# Patient Record
(demographics unavailable — no encounter records)

---

## 2024-11-11 NOTE — HISTORY OF PRESENT ILLNESS
[FreeTextEntry1] : f/up for Type 2 diabetes mellitus, obesity  Patient with past medical hx as below, remarkable for cirrhosis with ascites, fatty liver disease    Screening  Ophthalmology: follows LDL: 27 EGFR: 75   Current diabetic medication Ozempic 2mg Q weekly   no vomiting, no abd pain

## 2024-11-12 NOTE — ASSESSMENT
[FreeTextEntry1] : Ms. DESIREE DRISCOLL is 50-year-old female who is being seen for follow up visit with cirrhosis secondary to MASLD and MetALD.  BW on 11/11/2024: Normal PLT morphology, , INR 1.22, WDL-CMP, AFP, A1c% US ab on 05/28/2024: Coarsened hepatic parenchyma compatible with sequela of hepatocellular disease. Nonspecific gallbladder wall thickening to 0.34 cm. Gallbladder sludge and stones are seen. Splenomegaly. (15.4 cm)  # Cirrhosis on the Imaging + Ascites - Advised to hold off on Diuretic dose to Furosemide 20 mg and Spironolactone 25 mg/day as Ascites seems well controlled, denies any s/s dehydration or hypotension. Stable renal function and No s/s of fluid overload and Edema on PE today.  + PTHN - c/w Carvedilol 6.25 mg BD as tolerated, was on 3.25 mg. + Thrombocytopenia with Splenomegaly reviewed. with normal morphology of PLT, could hold off on EGD for now. > HCC screening- None in reviewed screening with WDL-AFP and US ab with no lesions. Due to repeating the screen with US and AFP as ordered. I have explained the need for imaging every 6 months to screen for liver cancer. + EV on EGD on 02/16/2021: Grade 1 varices in the lower third of the esophagus. I explained the risks for the development of esophageal varices with and without bleeding, especially while planning for Surgery. > No decompensation - No ascites, hepatocellular carcinoma, and liver failure. She has no signs of encephalopathy on the physical exam. She has no asterixis. > Transplant candidacy - Patient appears to be an excellent candidate for liver transplantation. However, with continued improvement in her overall medical condition and significantly improved MELD Na score, we have decided to defer evaluation for liver transplantation (Dr. NELSON on 09/28/2021)  # MASLD + Contributing factors + on Prednisone for polyarthritis since 05/2023, managed by Teja ALBA - BMD scan ordered to be done with US ab. Elevated A1C reviewed was taken after 2 doses of prednisone. + TICO on CPAP at night and o2 Mx Pulm MD. + DMt2 on Januvia and Jardiance (Mx Endo). Advised to increase the Ozempic every 4 weeks as tolerated to a max of 2mg to assist with the weight loss and thereby fatty liver.  # Obesity - Due to get the extra skin removal due to weight loss due to be done by Dr. Lauren Schikowtiz Behr (plastic surgeon) will be sending the risk assessment for the Sx as requested. > Remains on Ozempic 2 mg weekly INJ Mx by PCP. > Normalized BMI 24.3 < Overweight BMI 28.2 wt 208 lb (12/29/23)  + SxH/o she reports being 500 pounds in the past and undergoing gastric bypass surgery which was successful with significant weight loss.  + Cholelithiasis without cholecystitis. Denies any colicky abdominal pain related to food or any s/s of cholecystitis in the past or now. Educated on the signs of stone obstruction and s/s to self-monitor and seek medical attention.  + Small right renal cyst - Simple cysts are sacs filled with fluid. The exact cause of cysts is unknown, but cysts are more common with advancing age, they are usually asymptomatic and do not require any treatment. Very large renal cysts can cause dull pain or discomfort. Cysts can rarely become infected, develop bleeding, rupture or impaired function. Surgical removal or drainage and sclerotherapy of renal cysts can be performed in specific clinical settings.  # RHM: > HAV/HBV - Not immune as per 11/02/2022 BW s/p 3 dose series completed on 09/28/2021. > COL in November 2021 at which time a hyperplastic polyp was removed. Due to F/u with mouna HERRING MD.  PLAN to F/u in 6 months with repeat fasting laboratory tests and US ab as ordered. Encouraged to call back in the interim with any issues or concerns so that we can address and assist as required.

## 2024-11-12 NOTE — PHYSICAL EXAM
[Liver Size (___ Cm)] : Liver size [unfilled] cm [Non-Tender] : non-tender [Cognitive Mini-Mental Status Normal?] : Cognitive Mini Mental Status Exam is normal [General Appearance - Alert] : alert [General Appearance - In No Acute Distress] : in no acute distress [Sclera] : the sclera and conjunctiva were normal [PERRL With Normal Accommodation] : pupils were equal in size, round, and reactive to light [Outer Ear] : the ears and nose were normal in appearance [Neck Appearance] : the appearance of the neck was normal [Neck Cervical Mass (___cm)] : no neck mass was observed [Thyroid Diffuse Enlargement] : the thyroid was not enlarged [Jugular Venous Distention Increased] : there was no jugular-venous distention [Thyroid Nodule] : there were no palpable thyroid nodules [Heart Rate And Rhythm] : heart rate was normal and rhythm regular [Heart Sounds] : normal S1 and S2 [Heart Sounds Gallop] : no gallops [Murmurs] : no murmurs [Heart Sounds Pericardial Friction Rub] : no pericardial rub [Edema] : there was no peripheral edema [Bowel Sounds] : normal bowel sounds [Abdomen Soft] : soft [Abdomen Tenderness] : non-tender [Abdomen Mass (___ Cm)] : no abdominal mass palpated [No CVA Tenderness] : no ~M costovertebral angle tenderness [No Spinal Tenderness] : no spinal tenderness [Abnormal Walk] : normal gait [Nail Clubbing] : no clubbing  or cyanosis of the fingernails [Musculoskeletal - Swelling] : no joint swelling seen [Motor Tone] : muscle strength and tone were normal [Skin Color & Pigmentation] : normal skin color and pigmentation [Skin Turgor] : normal skin turgor [] : no rash [Deep Tendon Reflexes (DTR)] : deep tendon reflexes were 2+ and symmetric [Sensation] : the sensory exam was normal to light touch and pinprick [No Focal Deficits] : no focal deficits [Oriented To Time, Place, And Person] : oriented to person, place, and time [Impaired Insight] : insight and judgment were intact [Affect] : the affect was normal [Scleral Icterus] : No Scleral Icterus [Hepatojugular Reflux] : patient did not have a sustained hepatojugular reflux [Spider Angioma] : No spider angioma(s) were observed [Abdominal Bruit] : no abdominal bruit [Abdominal  Ascites] : no ascites [Ascites Fluid Wave] : no ascites fluid wave [Splenomegaly] : no splenomegaly [Asterixis] : no asterixis observed [Jaundice] : No jaundice [Palmar Erythema] : no Palmar Erythema [Depression] : no depression [FreeTextEntry1] : Multiple tattoos

## 2024-11-12 NOTE — HISTORY OF PRESENT ILLNESS
[de-identified] : \par   [FreeTextEntry1] : Ms. DESIREE DRISCOLL is 50-year-old female who is being seen for follow up visit with cirrhosis 2/2 to MASLD and past alcohol use. Normalized BMI and no reported alcohol relapses since 11/2020. She feels well and denies any complaints at this time.  Normalized BMI 24.3 < Overweight BMI 28.2 wt 208 lb (12/29/23) On Ozempic 2mg, Furosemide 20 mg and Spironolactone 25 mg/day, Nadolol 20 mg OD > Coreg? On Rosuvastatin 20 mg OD,. Lost 15 lb since Nov 2022, now obese class 1 with BMI 31.5 < 33.23.  On Ozempic at 2mg weekly INJ, Diuretic dose to Furosemide 40 mg and Spironolactone 50 mg/day for Ascites well tolerate and controlled, denies any s/s dehydration or hypotension.   P/Ho decompensated cirrhosis; her last alcoholic beverage was 11/2020 with her hospitalization with alcoholic hepatitis. She has a history of ascites, had 2 paracentesis done on 11/16 and 12/21/2020 and had No SBP.  She had right hepatic hydrothorax with thoracentesis on 04/05/2021.  MH/o DMt2 on Januvia and Jardiance (Mx Endo) on Prednisone for polyarthritis since 05/2023, managed by Teja ALBA and TICO on CPAP at night and o2 Mx Pulflora ALBA.  SH/o she reports being 500 pounds in the past and undergoing gastric bypass surgery which was successful with significant weight loss. + s/p bilateral mastopexy, excision of lateral chest wall skin excess on 6/17/24. Plans in December 2024 to discuss medial thigh lift and/or Brachioplasty. (Dr. Annalisa Martinez-Behr) COL in November 2021 at which time a hyperplastic polyp was removed.  EGD on 02/16/2021: Grade 1 varices in the lower third of the esophagus. Gastric Bypass with small sized pouch. GJ anastomosis+ (Blood type O+)  EGD in 11/2020 while hospitalized at [facility] which revealed her to have large distal esophageal varices.  These were not banded.  She was discharged on Nadolol   BW on 11/11/2024: Normal PLT morphology, , INR 1.22, WDL-CMP, AFP, A1c% US ab on 05/28/2024: Coarsened hepatic parenchyma compatible with sequela of hepatocellular disease. Nonspecific gallbladder wall thickening to 0.34 cm. Gallbladder sludge and stones are seen. Splenomegaly. (15.4 cm)  BW on 05/14/2024: , K+ 5.4, Tgl 162, WDL- H/H, WBC, CMP, AFP, INR, A1C%  11/11/2023 US ab: Cirrhosis. No focal hepatic lesions identified. Mild splenomegaly 14.9 cm, unchanged. BW: AST 70, AG 18, A1C 6.4% ; WDL- Lipids, INR, AFP 7.7 < 10.5 (07/07/2021)  Labs on 05/04/2023: A1C 8.5%, INR 1.18, Plt 129, (WDL- 09/01 and 11/03/2022) HDL 49, WDL- WBC, H/H, CMP, NR to HBsAg, anti-HBc, HCV. DFS70 DAMIAN 1:320 and +ESR 66.  * Cirrhosis on US Ab on 12/01/2022. Labs on 10/31/2022:  normalizing with WDL-AST/ALT/Tbil/H/H/WBC/. Down trending glucose 145 with A1C 8.7% and HDL 41 (09/01/2022)  MRI-AP on 03/05/2022: Cirrhosis. *Slightly limited evaluation of segment 6 secondary to artifact as above. +Cholelithiasis. +Splenomegaly. +Small right renal cyst.   Blood test May 12, 2021, alpha-fetoprotein 8.9, platelet count 93,000, INR 1.59; Blood test April 23, 2021, INR 1.62, platelet count 84,000.  MRI March 10, 2021, with a cirrhotic liver and a small amount of ascites.  Blood test from March 17, 2021, with a *Meld score of 19.  Blood tests December 3, 2020, creatinine 0.7, total bilirubin 8.9, ALT 30, alkaline phosphatase 143, INR 2.13.  ,000, *MELD sodium is 36. Blood test November 27, 2020, INR 2.8, total bilirubin 8, AST 65, ALT 23, creatinine 0.76.  *MELD score 26. Blood tests October 20, 2020, platelet count 71,000, total bilirubin 4.8, AST 83, albumin 2.2, creatinine 1.1, INR 2 meld 21. October 18, 2020, creatinine 1.1, AST 91, ALT 37, total bilirubin 5.8, INR 2.  Abdominal [id number]/19/2020, with +gallbladder sludge +mild splenomegaly and +ascites.  October 17, 2020, total bilirubin 8.1, ALT 46, , creatinine 1.1, INR 1.8, alkaline phosphatase 143.

## 2024-11-12 NOTE — HISTORY OF PRESENT ILLNESS
[de-identified] : \par   [FreeTextEntry1] : Ms. DESIREE DRISCOLL is 50-year-old female who is being seen for follow up visit with cirrhosis 2/2 to MASLD and past alcohol use. Normalized BMI and no reported alcohol relapses since 11/2020. She feels well and denies any complaints at this time.  Normalized BMI 24.3 < Overweight BMI 28.2 wt 208 lb (12/29/23) On Ozempic 2mg, Furosemide 20 mg and Spironolactone 25 mg/day, Nadolol 20 mg OD > Coreg? On Rosuvastatin 20 mg OD,. Lost 15 lb since Nov 2022, now obese class 1 with BMI 31.5 < 33.23.  On Ozempic at 2mg weekly INJ, Diuretic dose to Furosemide 40 mg and Spironolactone 50 mg/day for Ascites well tolerate and controlled, denies any s/s dehydration or hypotension.   P/Ho decompensated cirrhosis; her last alcoholic beverage was 11/2020 with her hospitalization with alcoholic hepatitis. She has a history of ascites, had 2 paracentesis done on 11/16 and 12/21/2020 and had No SBP.  She had right hepatic hydrothorax with thoracentesis on 04/05/2021.  MH/o DMt2 on Januvia and Jardiance (Mx Endo) on Prednisone for polyarthritis since 05/2023, managed by Teja ALBA and TICO on CPAP at night and o2 Mx Pulflora ALBA.  SH/o she reports being 500 pounds in the past and undergoing gastric bypass surgery which was successful with significant weight loss. + s/p bilateral mastopexy, excision of lateral chest wall skin excess on 6/17/24. Plans in December 2024 to discuss medial thigh lift and/or Brachioplasty. (Dr. Annalisa Martinez-Behr) COL in November 2021 at which time a hyperplastic polyp was removed.  EGD on 02/16/2021: Grade 1 varices in the lower third of the esophagus. Gastric Bypass with small sized pouch. GJ anastomosis+ (Blood type O+)  EGD in 11/2020 while hospitalized at [facility] which revealed her to have large distal esophageal varices.  These were not banded.  She was discharged on Nadolol   BW on 11/11/2024: Normal PLT morphology, , INR 1.22, WDL-CMP, AFP, A1c% US ab on 05/28/2024: Coarsened hepatic parenchyma compatible with sequela of hepatocellular disease. Nonspecific gallbladder wall thickening to 0.34 cm. Gallbladder sludge and stones are seen. Splenomegaly. (15.4 cm)  BW on 05/14/2024: , K+ 5.4, Tgl 162, WDL- H/H, WBC, CMP, AFP, INR, A1C%  11/11/2023 US ab: Cirrhosis. No focal hepatic lesions identified. Mild splenomegaly 14.9 cm, unchanged. BW: AST 70, AG 18, A1C 6.4% ; WDL- Lipids, INR, AFP 7.7 < 10.5 (07/07/2021)  Labs on 05/04/2023: A1C 8.5%, INR 1.18, Plt 129, (WDL- 09/01 and 11/03/2022) HDL 49, WDL- WBC, H/H, CMP, NR to HBsAg, anti-HBc, HCV. DFS70 DAMIAN 1:320 and +ESR 66.  * Cirrhosis on US Ab on 12/01/2022. Labs on 10/31/2022:  normalizing with WDL-AST/ALT/Tbil/H/H/WBC/. Down trending glucose 145 with A1C 8.7% and HDL 41 (09/01/2022)  MRI-AP on 03/05/2022: Cirrhosis. *Slightly limited evaluation of segment 6 secondary to artifact as above. +Cholelithiasis. +Splenomegaly. +Small right renal cyst.   Blood test May 12, 2021, alpha-fetoprotein 8.9, platelet count 93,000, INR 1.59; Blood test April 23, 2021, INR 1.62, platelet count 84,000.  MRI March 10, 2021, with a cirrhotic liver and a small amount of ascites.  Blood test from March 17, 2021, with a *Meld score of 19.  Blood tests December 3, 2020, creatinine 0.7, total bilirubin 8.9, ALT 30, alkaline phosphatase 143, INR 2.13.  ,000, *MELD sodium is 36. Blood test November 27, 2020, INR 2.8, total bilirubin 8, AST 65, ALT 23, creatinine 0.76.  *MELD score 26. Blood tests October 20, 2020, platelet count 71,000, total bilirubin 4.8, AST 83, albumin 2.2, creatinine 1.1, INR 2 meld 21. October 18, 2020, creatinine 1.1, AST 91, ALT 37, total bilirubin 5.8, INR 2.  Abdominal [id number]/19/2020, with +gallbladder sludge +mild splenomegaly and +ascites.  October 17, 2020, total bilirubin 8.1, ALT 46, , creatinine 1.1, INR 1.8, alkaline phosphatase 143.

## 2024-12-06 NOTE — HISTORY OF PRESENT ILLNESS
[FreeTextEntry1] : 50 YEAR OLD FEMALE PRESENTS TO WOUND CENTER WITH A NON-HEALING ulcer plantar 5th metatarsal head right foot previously treated by Dr. Tee podiatrist with debridement patient has had wound for 1.5 years and no improvement minimal depth noted to wound without sinus tracts and not probing to bone patient went to dermatologist who was concerned that there was "gas in the tissue" No edema, erythema drainage or cellulitis HT 2-5 both feet +DM with hereditary Sensory autonomic neuropathy and diabetic neuropathy +ETOH Abuse 4 years sober HGBA1C= 5.2 mg/dl and RBS 92 mg/dl.DP 2/4 both feet PT 2/4 both feet Absent protective threhold both feet patient unable to sense 5.07 semmes napoleon monofilament both feet EXAM: [id number] - XR FOOT 2 VIEWS RT - ORDERED BY: ANDREZ SÁNCHEZ  PROCEDURE DATE: 11/12/2024  INTERPRETATION: 2 views of the right foot  INDICATION: Concern for osteomyelitis.  IMPRESSION: Soft tissue swelling at the lateral surface of the fifth MTP joint. There is a tracking gas. There is no suspicious osseous lesion or radiographic evidence of osteomyelitis. Joint spaces are preserved. Negative for fracture. --- End of Report --- DEENA BARNEY MD; Attending Radiologist This document has been electronically signed. Nov 20 2024 12:03PM  Review of the x-rays right foot do not show any signs of gas in the tissue no clinical signs of osteomyelitis no signs of cellulitis chronic stable ulcer 5th metatarsal head right foot recommend patient stay off right foot and return 2 weeks stress importance of not doing pilates anymore since constant pressure and exercise is causing wound to not heal

## 2024-12-06 NOTE — PLAN
[FreeTextEntry1] : full thickness debridement of ulceration 5th metatarsal head right foot with sterile #10 blade Rx Diabetic cam walker with dispersion to off load 5th met head right foot splaying of metatarsals patient wearting poorly supportive shoes evaluation of x-rays do not reveal any obvious signs of osteomeyleitis But consider MRI with contrast if any signs of cellulitis or infection in future recommend patietn ambulate in cam walker to eliminate propulsive phase of gait and shorten stride lenght recommend patient stay off right foot as much as possible continue with mupirocin gauze and debbie daily patient told to return 2 weeks or sooner if any signs of infection no need for oral antibiosis

## 2024-12-13 NOTE — HISTORY OF PRESENT ILLNESS
[FreeTextEntry1] : CPE [de-identified] : 51 yo F PMHx alcoholic cirrhosis 2/2 nonalcoholic steatohepatitis and alcoholic liver disease, pleural effusion, hydrothorax and ascites, depression and anxiety, neuropathy, Dm type 2, CAD, hx of gastric bypass presents for CPE Has medical marijuana card for THC for insomnia Was seen by hepatology -- no longer on lasix, spironolactone . Reports no swelling. Feels well. On ozempic, has been losing weight no side effects spoke to rheum today due to left shoulder pain. Feels like stiffness in the morning that resolves throughout the day. No swelling, no trauma. Rated 3-4/10. Has been taking tylenol with relief. Recently returned from trip to [facility], [facility] and [facility]  Rheum Dr Gigi Dan Endo Dr Baron PSych Dr Giovanni Gan Hepatology Jacy Armstrong NP Surgery Dr Marquez

## 2024-12-13 NOTE — HISTORY OF PRESENT ILLNESS
[FreeTextEntry1] : CPE [de-identified] : 51 yo F PMHx alcoholic cirrhosis 2/2 nonalcoholic steatohepatitis and alcoholic liver disease, pleural effusion, hydrothorax and ascites, depression and anxiety, neuropathy, Dm type 2, CAD, hx of gastric bypass presents for CPE Has medical marijuana card for THC for insomnia Was seen by hepatology -- no longer on lasix, spironolactone . Reports no swelling. Feels well. On ozempic, has been losing weight no side effects spoke to rheum today due to left shoulder pain. Feels like stiffness in the morning that resolves throughout the day. No swelling, no trauma. Rated 3-4/10. Has been taking tylenol with relief. Recently returned from trip to [facility], [facility] and [facility]  Rheum Dr Gigi Dan Endo Dr Baron PSych Dr Giovanni Gan Hepatology Jacy Armstrong NP Surgery Dr Marquez

## 2024-12-13 NOTE — REVIEW OF SYSTEMS
[Negative] : Psychiatric [Joint Pain] : joint pain [Joint Stiffness] : joint stiffness [Joint Swelling] : no joint swelling [Muscle Weakness] : no muscle weakness [Muscle Pain] : muscle pain [Back Pain] : no back pain [FreeTextEntry9] : left shoulder pain

## 2024-12-13 NOTE — HEALTH RISK ASSESSMENT
[No] : In the past 12 months have you used drugs other than those required for medical reasons? No [0] : 2) Feeling down, depressed, or hopeless: Not at all (0) [PHQ-2 Negative - No further assessment needed] : PHQ-2 Negative - No further assessment needed [Never] : Never [NO] : No [None] : None [With Family] : lives with family [Employed] : employed [] :  [Feels Safe at Home] : Feels safe at home [Fully functional (bathing, dressing, toileting, transferring, walking, feeding)] : Fully functional (bathing, dressing, toileting, transferring, walking, feeding) [Fully functional (using the telephone, shopping, preparing meals, housekeeping, doing laundry, using] : Fully functional and needs no help or supervision to perform IADLs (using the telephone, shopping, preparing meals, housekeeping, doing laundry, using transportation, managing medications and managing finances) [Smoke Detector] : smoke detector [Carbon Monoxide Detector] : carbon monoxide detector [Audit-CScore] : 0 [[id number]] : 0 [HIV test declined] : HIV test declined [Hepatitis C test declined] : Hepatitis C test declined [Change in mental status noted] : No change in mental status noted [Language] : denies difficulty with language [Handling Complex Tasks] : denies difficulty handling complex tasks [Reports changes in hearing] : Reports no changes in hearing [Reports changes in vision] : Reports no changes in vision [Reports changes in dental health] : Reports no changes in dental health [MammogramDate] : 09/24 [BoneDensityDate] : 07/23 [PapSmearDate] : 09/24 [BoneDensityComments] : osteopenia [ColonoscopyDate] : 01/22 [ColonoscopyComments] : Dr Olivares, repeat 7-10 years [de-identified] : with  [FreeTextEntry2] :  [facility]

## 2024-12-13 NOTE — PLAN
[FreeTextEntry1] : Health Care Maintenance - cbc, cmp, a1c performed nov 2024. lipid panel, tsh today, follow up results -- bloodwork performed in office - EKG Sept 2024  - Colonoscopy Jan 2022, Dr Carroll, repeat 7-10 years - Mammo Sept 2024  - Pap Sept 2024 - DEXA scan July 2023, osteopenia - depression screen negative - recommend annual skin cancer screening with Dermatologist - recommended annual eye exam with Ophthalmologist - recommended annual dental exam - h/o alcoholic cirrhosis 2/2 nonalcoholic steatohepatitis and alcoholic liver disease, pleural effusion, hydrothorax and ascites, depression and anxiety, neuropathy, Dm type 2, CAD, hx of gastric bypass  - continue lifestyle modifications - CPE in 1 year or sooner visit as needed  DM type 2, now in prediabetic range  - A1c 5.6 Nov 2024 - continue ozempic per endocrine - Advised on lifestyle modifications such as increasing exercise and dietary changes. - pods and ophtho follow up   Left shoulder pain - possibly 2/2 overuse? - reports relief with tylenol - rest, ice and tylenol/nsaids   HLD - f/u lipid panel  - continue rosuvastatin 20 mg QD - Advised on lifestyle modifications such as increasing exercise, cardio at least 150 mins per week and dietary changes.  PMR - following with rheum Dr Gigi Dan  alcoholic cirrhosis 2/2 nonalcoholic steatohepatitis - no longer on spironolactone 50 mg QD and nadolol 40 mg QD and furosemide 40 mg QD - hepatology follow up  Depression and anxiety - symptoms controlled - continue mirtazipine 7.5 mg QD.

## 2024-12-13 NOTE — HEALTH RISK ASSESSMENT
[No] : In the past 12 months have you used drugs other than those required for medical reasons? No [0] : 2) Feeling down, depressed, or hopeless: Not at all (0) [PHQ-2 Negative - No further assessment needed] : PHQ-2 Negative - No further assessment needed [Never] : Never [NO] : No [None] : None [With Family] : lives with family [Employed] : employed [] :  [Feels Safe at Home] : Feels safe at home [Fully functional (bathing, dressing, toileting, transferring, walking, feeding)] : Fully functional (bathing, dressing, toileting, transferring, walking, feeding) [Fully functional (using the telephone, shopping, preparing meals, housekeeping, doing laundry, using] : Fully functional and needs no help or supervision to perform IADLs (using the telephone, shopping, preparing meals, housekeeping, doing laundry, using transportation, managing medications and managing finances) [Smoke Detector] : smoke detector [Carbon Monoxide Detector] : carbon monoxide detector [Audit-CScore] : 0 [DML6Rfyce] : 0 [HIV test declined] : HIV test declined [Hepatitis C test declined] : Hepatitis C test declined [Change in mental status noted] : No change in mental status noted [Language] : denies difficulty with language [Handling Complex Tasks] : denies difficulty handling complex tasks [Reports changes in hearing] : Reports no changes in hearing [Reports changes in vision] : Reports no changes in vision [Reports changes in dental health] : Reports no changes in dental health [MammogramDate] : 09/24 [PapSmearDate] : 09/24 [BoneDensityDate] : 07/23 [BoneDensityComments] : osteopenia [ColonoscopyDate] : 01/22 [ColonoscopyComments] : Dr Olivares, repeat 7-10 years [de-identified] : with  [FreeTextEntry2] :  [facility]

## 2024-12-27 NOTE — HISTORY OF PRESENT ILLNESS
[FreeTextEntry1] : 50 YEAR OLD FEMALE PRESENTS TO WOUND CENTER WITH A NON-HEALING ulcer plantar 5th metatarsal head right foot previously treated by Dr. 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  IMPRESSION: Soft tissue swelling at the lateral surface of the fifth MTP joint. There is a tracking gas. There is no suspicious osseous lesion or radiographic evidence of osteomyelitis. Joint spaces are preserved. Negative for fracture. --- End of Report --- DEENA BARNEY MD; Attending Radiologist This document has been electronically signed. Nov 20 2024 12:03PM  Review of the x-rays right foot do not show any signs of gas in the tissue no clinical signs of osteomyelitis no signs of cellulitis chronic stable ulcer 5th metatarsal head right foot recommend patient stay off right foot and return 2 weeks stress importance of not doing pilates anymore since constant pressure and exercise is causing wound to not heal decreased size and depth of wound patient admits to walking without cam walker patient repsents without cam walker

## 2024-12-27 NOTE — PLAN
[FreeTextEntry1] : full thickness debridement of ulceration 5th metatarsal head right foot with sterile #10 blade Stress importance of wearing Diabetic cam walker with dispersion to off load 5th met head right foot splaying of metatarsals at all times patient wearing poorly supportive shoes evaluation of x-rays do not reveal any obvious signs of osteomeyleitis But consider MRI with contrast if any signs of cellulitis or infection in future recommend patietn ambulate in cam walker to eliminate propulsive phase of gait and shorten stride lenght recommend patient stay off right foot as much as possible continue with mupirocin gauze and debbie daily patient told to return 2 weeks or sooner if any signs of infection no need for oral antibiosis decreased size and depth no signs of infection

## 2025-01-17 NOTE — HISTORY OF PRESENT ILLNESS
[FreeTextEntry1] : 50 YEAR OLD FEMALE PRESENTS TO WOUND CENTER WITH A NON-HEALING ulcer plantar 5th metatarsal head right foot previously treated by Dr. 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  IMPRESSION: Soft tissue swelling at the lateral surface of the fifth MTP joint. There is a tracking gas. There is no suspicious osseous lesion or radiographic evidence of osteomyelitis. Joint spaces are preserved. Negative for fracture. --- End of Report --- DEENA BARNEY MD; Attending Radiologist This document has been electronically signed. Nov 20 2024 12:03PM  Review of the x-rays right foot do not show any signs of gas in the tissue no clinical signs of osteomyelitis no signs of cellulitis chronic stable ulcer 5th metatarsal head right foot recommend patient stay off right foot and return 2 weeks stress importance of not doing pilates anymore since constant pressure and exercise is causing wound to not heal decreased size and depth of wound patient admits to walking without cam walker patient presents with cam walker tremendous improvement in wound decreased size and depth right foot 5th metatarsal

## 2025-01-17 NOTE — PLAN
[FreeTextEntry1] : full thickness debridement of ulceration 5th metatarsal head right foot with sterile #10 blade dramatic improvement in wound right foot Stress importance of wearing Diabetic cam walker with dispersion to off load 5th met head right foot splaying of metatarsals at all times patient wearing poorly supportive shoes evaluation of x-rays do not reveal any obvious signs of osteomeyleitis No need for MRI with contrast since no signs of of cellulitis or infection  recommend patient continue to  ambulate in cam walker to eliminate propulsive phase of gait and shorten stride length for two more weeks or until ulcer is healed recommend patient stay off right foot as much as possible continue with mupirocin gauze and debbie daily patient told to return 2 weeks or sooner if any signs of infection no need for oral antibiosis decreased size and depth no signs of infection recommend patient get new custom made orthotic devices with dispersion for right 5th metatarsal head

## 2025-01-31 NOTE — HISTORY OF PRESENT ILLNESS
[FreeTextEntry1] : Marta Miller is a 51 y/o female s/p bilateral mastopexy, excision of lateral chest wall skin excess on 6/17/24. Patient has no complaints today  INTERVAL HISTORY: Marta Miller is a 51 y/o female with history of massive weight loss following gastric bypass surgery. Patient c/o excess skin to both her thighs. She states she has difficulty fitting properly into clothing and this excess thigh skin interferes with exercise. Marta as well c/o this excess skin "getting caught" in the toilet seat. This causes significant pain. She as well admits to experiencing rashes to the thigh skin folds for which she has treated with creams and hydrocortisone, with no relief.  Prebariatric weight- 520lbs Current weight- 174lbs Patient is weight stable for over 6 months PMHx- DM, depression/anxiety, liver cirrhosis (currently off meds), PMR, HLD PSHx- lower body lift, Gastric bypass 2004, bilateral mastopexy, excision of lateral chest wall skin excess Allergies- PCN, fluoroquinolones Denies tobacco use Family history significant for Mother: DM, HTN, CAD. Father: HTN, CAD [employer] employee in behavioral health

## 2025-01-31 NOTE — ADDENDUM
[FreeTextEntry1] :  I, Harry Robles, documented this note as a scribe on behalf of Dr. Lauren Shikowitz-Behr, MD on 01/24/2025.

## 2025-01-31 NOTE — HISTORY OF PRESENT ILLNESS
[FreeTextEntry1] : Marta Miller is a 51 y/o female s/p bilateral mastopexy, excision of lateral chest wall skin excess on 6/17/24. Patient has no complaints today  INTERVAL HISTORY: Marta Miller is a 51 y/o female with history of massive weight loss following gastric bypass surgery. Patient c/o excess skin to both her thighs. She states she has difficulty fitting properly into clothing and this excess thigh skin interferes with exercise. Marta as well c/o this excess skin "getting caught" in the toilet seat. This causes significant pain. She as well admits to experiencing rashes to the thigh skin folds for which she has treated with creams and hydrocortisone, with no relief.  Prebariatric weight- 520lbs Current weight- 174lbs Patient is weight stable for over 6 months PMHx- DM, depression/anxiety, liver cirrhosis (currently off meds), PMR, HLD PSHx- lower body lift, Gastric bypass 2004, bilateral mastopexy, excision of lateral chest wall skin excess Allergies- PCN, fluoroquinolones Denies tobacco use Family history significant for Mother: DM, HTN, CAD. Father: HTN, CAD [employer] employee in behavioral health

## 2025-01-31 NOTE — PHYSICAL EXAM
[NI] : Normal [de-identified] : NAD, AxOx3 [de-identified] : nonlabored breathing  [de-identified] : Bilateral medial thighs with significant skin excess  No lipodystrophy appreciated  rashes/irritation present on exam   [de-identified] : as above [de-identified] : grossly intact  [de-identified] : normal affect

## 2025-01-31 NOTE — END OF VISIT
[FreeTextEntry3] : All medical record entries made by the Scribe were at my, Dr. Lauren Shikowitz-Behr, MD, direction and personally dictated by me on 01/24/2025. I have reviewed the chart and agree that the record accurately reflects my personal performance of the history, physical exam, assessment and plan. I have also personally directed, reviewed, and agreed with the chart. [Time Spent: ___ minutes] : I have spent [unfilled] minutes of time on the encounter which excludes teaching and separately reported services.

## 2025-01-31 NOTE — PHYSICAL EXAM
[NI] : Normal [de-identified] : NAD, AxOx3 [de-identified] : nonlabored breathing  [de-identified] : Bilateral medial thighs with significant skin excess  No lipodystrophy appreciated  rashes/irritation present on exam   [de-identified] : as above [de-identified] : grossly intact  [de-identified] : normal affect

## 2025-03-12 NOTE — ASSESSMENT
[FreeTextEntry1] : Assessment/Plan:  50 year old female w/ hx of alcoholic liver cirrhosis (dx'd 2020), T2DM (diagnosed 9/2022), referred to neurology for brain fog/word finding difficulty, peripheral neuropathy and gait imbalance.  1# Brain fog / word finding difficulty- likely multifactorial- 2/2 to mood disorder +/- long standing hx of alcohol use +/- hepatic disease +/- sleep disorder (chronic insomnia). Low suspicion for neurodegenerative disorder MRI brain normal Neuropsych testing by Dr Adamaris Singh 6/13/2023- intact. Will continue to monitor  2# Peripheral neuropathy- likely mixed/multifactorial (T2DM +/- toxic effects from long standing alcohol use +/-Hereditary neuropathy i/s/o of known family hx- CMT). 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  EMG/NCS 10/15/2023 showed mixed sensorimotor axonal polyneuropathy with superimposed mild b/l CTS and mild R ulnar neuropathy - most likely 2/2 T2DM, however the possibility of CMT2 (hereditary motor sensory neuropathy) was suggested given hx of high arch feet and hammer toes.  Neuropathy overall stable, though experiencing more numbness in feet. Pain improved on duloxetine. Given slight progression in neuropathy need to consider the possibility of superimposed immune mediated neuropathy (tegan i/s/o of systemic autoimmune disease and otherwise stable T2DM and alcohol abstinence), however pt wishes to defer on skin biopsy or CSF testing at this time. Also, progression could simply be 2/2 to hereditary neuropathy. Will follow closely for any further rapid progression.   3# Gait imbalance most likely 2/2 sensory ataxia from peripheral neuropathy Continue PT  Plan:- [] Continue gabapentin 500 mg QHS (prescribed by psych). Higher doses make her drowsy. [] Continue duloxetine 40 mg QD (prescribed by psych) [] Topical Capsaicin at bedtime [] Continue alpha lipoic acid 600 mg QD   Return to clinic 6 months  The above plan was discussed with DESIREE DRISCOLL in great detail. DESIREE DRISCOLL verbalized understanding and agrees with plan as detailed above. Patient was provided education and counselling on current diagnosis/symptoms. She was advised to call our clinic at [phone] for any new or worsening symptoms, or with any questions or concerns. DESIREE DRISCOLL expressed understanding and all her questions/concerns were addressed.    Dennise Patiño M.D.

## 2025-03-12 NOTE — PHYSICAL EXAM
[FreeTextEntry1] : Motor: Strength is full bilaterally. 5/5 muscle power in bilateral UE and LE. Reflexes: R L  Biceps 2+ 2+  Patellar 1+ 1+  Achilles 0 0  Plantar responses- R down, L down Sensory: Reduced sensation to PP up to ankles b/l. Reduced sensation to temp and vibration in feet compared to hands. Vib < 10 seconds toes (L<R) Gait: wide based gait. positive Romberg.  b/l hammer toes and high arches S.

## 2025-03-12 NOTE — HISTORY OF PRESENT ILLNESS
[FreeTextEntry1] : INTERIM HX 03/12/2025: Hereditary neuropathy panel 9/2024- VUS (GARS1 gene, heterozygous)- pathogenic variants have been identified in pts with CMT2D. Psych is managing her duloxetine, and she is now on 40 mg QD.  Flares up of finger joint swelling and stiffness, scheduled to see Rheum.  Feet always cold. Numbness up to ankle now. Minimal tingling in fingertips.  mother and great father had hammer toes and high arches. Mother is alive and walks with walker.   INTERIM HX 08/14/2024: Doing well. Psych increased gabapentin to 500 mg QD. She is taking duloxetine 20 mg QD- helps her neuropathy pain at night. Tolerating the med better now, initially made her very drowsy. She had breast lift and skin fold removal surgery in June. Seeing PT for balance.  INTERIM HX 12/29/2023: EMG/NCS 10/15/2023 showed mixed sensorimotor axonal polyneuropathy with superimposed mild b/l CTS and mild R ulnar neuropathy - most likely 2/2 T2DM, however the possibility of CMT2 (hereditary motor sensory neuropathy) was suggested given hx of high arch feet and hammer toes.  She is doing well. PMR symptoms better. Neuropathy is stable, tingling in fingertips and feet.   INTERIM HX 06/29/2023: MRI brain 3/25/2023- normal.  Labs 4'23- Vitamin b6 nl, dsnda nl, ACE nl, SS nl, Lyme neg, MMA nl, cryoglobulin nl, ganglioside ab nl, syphilis neg, C3 and C4 nl. Folate nl. normal Spep                  Abnormal labs: ESR 60 (recent 14 on 6/2023). B12 < 2000. Elevated k/l ratio 1.84 and FLC  Can sometimes take extra of gabapentin for nerve pain. Nerve pain better/more tolerable. No progression.  Seeing Rheum, Dr Gigi edmond, possible PMR, on prednisone (tapering), helping.  Referred to hem for elevated K/L ratio- seen last month, no concerns, repeat KL ratio normal.  Neuropsych testing 2 weeks ago, Dr Adamaris Singh (pending results)- pt reports "it was fine" Did 4 sessions of PT, had to stop as she is caring for mother. She is doing exercises at home. Helping a little.  Seeing pulm for chronic insomnia, PLS and nocturnal hypoxemia, using O2 at home. No TICO on sleep study. --------------------------------------------------------------------------------------------------------------------------------------- HPI (initial visit Mar 02, 2023)- DESIREE DRISCOLL is a 48 year old woman w/ hx of T2DM with peripheral neuropathy, HLD, alcoholic cirrhosis w/ ascites, hx of gastric bypass, depression/anxiety referred to neurology for brain fog and nerve pain.   In the last 1-2 years has been experiencing "foggy brain". Word finding difficulty. Not progression. Comes and goes. Worse when tired or stressed. Not daily issues. No disorientation. no issues with faces or names. Memory of recent events or past events intact. She is 2 years sober (alcoholic x 15 years, 2-3 bottles of wine a day75 ounces a day). She was diagnosed with cirrhosis 10/2020, she was hospitalized (felt ill, shaky, jaundiced), was put on liver transplant list, since her LFT;s have improved, and transplant now on hold. She has been told she snored, she does have daytime somnolence. Dry mouth in mornings (is on meds that can cause this). no morning headaches. Tested for sleep apnea, prior to 2004, had "mild" sleep apnea, no Cpap recommended. TSH normal 9/2022.  She has been on gabapentin 400 mg qhs for her neuropathy, higher dose make her sleepy, helps the pain. Neuropathy symptoms began 2 years ago. She was preDM for several years, diagnosed with T2DM 6 months ago. Tingling and numbness in toes, anterior sole and dorsum of foot. Diagnosed by podiatrist.  At night skin around the feet hurt (allodynia). She has noticed some gait imbalance. cannot differentiate between hot and cold. hbA1c 9/2022 6/7%, most recent 7.9% 1/2023. Neuropathy stable in last 6 months. Mild sensitivity in tips of fingers, this started in the last 8 months.

## 2025-03-12 NOTE — DATA REVIEWED
[de-identified] : MRI brain 3/25/2023- normal [de-identified] :  EMG/NCS 10/15/2023 showed mixed sensorimotor axonal polyneuropathy with superimposed mild b/l CTS and mild R ulnar neuropathy - most likely 2/2 T2DM, however the possibility of CMT2 (hereditary motor sensory neuropathy) was suggested given hx of high arch feet and hammer toes.

## 2025-03-18 NOTE — PLAN
[FreeTextEntry1] : Excess skin of thighs - planned for surgery Sept 2025 - RTO for preop clearance  Insomnia - continue trazodone 25 mg QD per psych  DM type 2, now in prediabetic range - A1c 5.6 Nov 2024 - continue ozempic per endocrine - Advised on lifestyle modifications such as increasing exercise and dietary changes. - pods and ophtho follow up  HLD - continue rosuvastatin 20 mg QD - Advised on lifestyle modifications such as increasing exercise, cardio at least 150 mins per week and dietary changes.  PMR - following with rheum Dr Gigi Dan  alcoholic cirrhosis 2/2 nonalcoholic steatohepatitis - no longer on spironolactone 50 mg QD and nadolol 40 mg QD and furosemide 40 mg QD - hepatology follow up  Depression and anxiety - symptoms controlled - continue mirtazipine 7.5 mg QD.

## 2025-03-18 NOTE — HISTORY OF PRESENT ILLNESS
[Other Location: e.g. Home (Enter Location, City,State)___] : at [unfilled] [Telehealth (audio & video)] : This visit was provided via telehealth using real-time 2-way audio visual technology. [Verbal consent obtained from patient] : the patient, [unfilled] [FreeTextEntry1] : follow up [de-identified] : 49 yo F PMHx alcoholic cirrhosis 2/2 nonalcoholic steatohepatitis and alcoholic liver disease, pleural effusion, hydrothorax and ascites, depression and anxiety, neuropathy, Dm type 2, CAD, hx of gastric bypass presents for follow up Patient will be having excess skin on bilateral thighs removed via surgery September 10, 2025 with Dr Shikowitz Behr. Patient reports excessive skin of inner thighs that has been present since weight loss over the past 3 years.  Due to excessive skin, this makes it difficult for her to exercise, walk and sit due to friction and weight of excess skin.  She reports hx of rashes in folds of excess skin. Due to friction from excess skin, it causes skin breakdown in groin region.  Patient now on trazodone 25 mg QD from psychiatrist for insomnia. Started 5 days prior. Has been sleeping 6 hours per night since starting medication (previously sleeping 3-4 hours). OF note, will be traveling to [place] next month.   Rheum Dr Gigi Dan Endo Dr Baron PSych Dr Giovanni Gan Hepatology Jacy Armstrong  Surgery Dr Marquez

## 2025-03-25 NOTE — DATA REVIEWED
[FreeTextEntry1] : Labs and chart notes reviewed today with patient prior labs with normal inflammation +DAMIAN

## 2025-03-25 NOTE — ASSESSMENT
[FreeTextEntry1] :  # Prior diagnosis of PMR  -Elevated ESR/CRP noted on 4/1/23 (with neuro), in the setting of new onset of bilateral arm/shoulder pain -Resolution of symptoms with prednisone 10 mg daily -Prednisone taper completed, off prednisone since April 2024 -Now with recurrence over the past 2 months of pain in the shoulders (asymmetric), hips (bilateral, a week ago, + stiffness),  endorses new headache frontal, occasional transient blurry vision, no jaw claudication ---discussed with patient a length, clinically concerning for PMR relapse + possible GCA given above although she attributes the HA to the recent use of trazodone (HA reported in 10-20%)  --will obtain inflammatory markers and labs today --reviewed the risk of irreversible visual loss if untreated GCA --will obtain US of the TA for now, but discussed that a biopsy is the gold standard --neuro ophthalmology eval  --if recurrence, discussed steroid sparing agent that needs to be discussed with hepatology given liver related adverse effects (MTX vs tocilizumab)  #Bilateral hand pain,  -prior evaluation history and exam without signs of inflammatory arthritis -RF and CCP negative - x-rays of bilateral hands December 2022 normal -ultrasound of bilateral hands: No synovitis ---now with reported swelling/redness/warmth/ prolonged stiffness and synovitis on exam potentially related to flare as above --will repeat labs and obtain imaging (X-rays and US)   #DAMIAN positive (2020) subset serology negative --will obtain today and urine studies   #elevated Kappa/lambda ratio, s.p heme eval thrombocytopenia- to fup again with hematology  # liver cirrhosis, follows with hepatology to schedule for next month   #Bone health DEXA completed in July 2023, osteopenia due July 2025  RTO in 2 weeks will call with results

## 2025-03-25 NOTE — HISTORY OF PRESENT ILLNESS
[FreeTextEntry1] : Prior follow up for PMR, last seen March 2024, has been off prednisone for a year now Reports that over the past 2 months had 3 flares of joint pain pain involving the hands/knuckles, reports stiffness and sensitivity reported occasional swelling and redness associated with morning stiffness for an hour takes extra gabapentin  no Tylenol or NSAIDs no recurrence of original pain in the shoulders a week ago noticed pain in both hips after prolonged sitting  new HA since she started trazodone, daily for 2 weeks bi frontal  occasional blurry vision

## 2025-03-25 NOTE — PHYSICAL EXAM
[General Appearance - Alert] : alert [General Appearance - In No Acute Distress] : in no acute distress [Respiration, Rhythm And Depth] : normal respiratory rhythm and effort [FreeTextEntry1] : mild synovitis and tenderness at left wrist, tenderness at all PIPs. painful ROM at bilateral hips  [Impaired Insight] : insight and judgment were intact

## 2025-04-21 NOTE — DATA REVIEWED
[FreeTextEntry1] : Labs, imaging and chart notes reviewed today with patient inflammatory markers within normal range negative serology  x-rays of hands without arthritic changes

## 2025-04-21 NOTE — PHYSICAL EXAM
[General Appearance - Alert] : alert [General Appearance - In No Acute Distress] : in no acute distress [Respiration, Rhythm And Depth] : normal respiratory rhythm and effort [FreeTextEntry1] : no synovitis, tenderness to all tested joints  [Impaired Insight] : insight and judgment were intact

## 2025-04-21 NOTE — HISTORY OF PRESENT ILLNESS
[FreeTextEntry1] : At today's visit -continues to have pain in all joints -some swelling of the feet -worse pain in the morning, improves by mid day  -HA on/off, dull in general       visit 3/25/25 Prior follow up for PMR, last seen March 2024, has been off prednisone for a year now Reports that over the past 2 months had 3 flares of joint pain pain involving the hands/knuckles, reports stiffness and sensitivity reported occasional swelling and redness associated with morning stiffness for an hour takes extra gabapentin no Tylenol or NSAIDs no recurrence of original pain in the shoulders a week ago noticed pain in both hips after prolonged sitting new HA since she started trazodone, daily for 2 weeks bi frontal occasional blurry vision

## 2025-05-14 NOTE — CARDIOLOGY SUMMARY
[de-identified] : 3/4/2021, sinus bradycardia 59 bpm, old inferior MI, poor R-wave progression [de-identified] : 3/15/2021 dobutamine stress echo, 72 % MPHR, normal augmentation of systolic function [de-identified] : 3/15/2021, mildly dilated LA, borderline pulmonary pressures, PASP 35 mmHg, normal LV systolic function, LVEF 70% [de-identified] : 3/17/2023 - [facility] with Lorin Roberts MD showing moderate prox-LAD disease

## 2025-05-14 NOTE — DISCUSSION/SUMMARY
[FreeTextEntry1] : 50 year-old woman with history as above who presented initially for cardiac evaluation prior to possible liver transplant. No longer being evaluated for liver transplant after clinical improvement after stopping drinking. Her overall health is markedly improved. She continues to lose weight by diet, exercise, and GLP-1 medication. Her endocrinologist plans to wean down the GLP-1 medication to see if she still needs it. Her A1c and weight are both now normal range.   CTCA showed severe atherosclerotic disease.  Left heart catheterization showed prox-LAD disease that is being medically managed. She will continue rosuvastatin 20 mg daily and will start ASA 81 mg daily (enteric coated). LDL is very well controlled - most recently 33 mg/dL in May 2024. She will continue her beta-blocker, which has been transitioned from nadolol (which caused orthostatic symptoms) to carvedilol.   Continue Ozempic for diabetes and weight loss.  Recommend ongoing diet and exercise. [EKG obtained to assist in diagnosis and management of assessed problem(s)] : EKG obtained to assist in diagnosis and management of assessed problem(s)

## 2025-05-14 NOTE — PHYSICAL EXAM
[General Appearance - Well Developed] : well developed [Normal Appearance] : normal appearance [Well Groomed] : well groomed [General Appearance - Well Nourished] : well nourished [No Deformities] : no deformities [General Appearance - In No Acute Distress] : no acute distress [Conjunctiva] : the conjunctiva were normal in both eyes [Normal] : the eyelids were normal bilaterally [PERRL] : pupils were equal in size, round, and reactive to light [EOM Intact] : extraocular movements were intact [Yellow Sclera (Icteric)] : scleral icterus was noted bilaterally [Normal Oral Mucosa] : normal oral mucosa [No Oral Pallor] : no oral pallor [No Oral Cyanosis] : no oral cyanosis [Normal Oropharynx] : normal oropharynx [Normal Jugular Venous A Waves Present] : normal jugular venous A waves present [Normal Jugular Venous V Waves Present] : normal jugular venous V waves present [No Jugular Venous Gamble A Waves] : no jugular venous gamble A waves [] : no respiratory distress [Respiration, Rhythm And Depth] : normal respiratory rhythm and effort [Exaggerated Use Of Accessory Muscles For Inspiration] : no accessory muscle use [Auscultation Breath Sounds / Voice Sounds] : lungs were clear to auscultation bilaterally [Heart Rate And Rhythm] : heart rate and rhythm were normal [Heart Sounds] : normal S1 and S2 [Bowel Sounds] : normal bowel sounds [Abdomen Soft] : soft [Abdomen Tenderness] : non-tender [Abnormal Walk] : normal gait [Gait - Sufficient For Exercise Testing] : the gait was sufficient for exercise testing [Nail Clubbing] : no clubbing of the fingernails [Cyanosis, Localized] : no localized cyanosis [Skin Color & Pigmentation] : normal skin color and pigmentation [No Venous Stasis] : no venous stasis [No Xanthoma] : no  xanthoma was observed [Oriented To Time, Place, And Person] : oriented to person, place, and time [Impaired Insight] : insight and judgment were intact [Affect] : the affect was normal [Mood] : the mood was normal [No Anxiety] : not feeling anxious [FreeTextEntry1] : Ohm symbol on her left breast; compass on right foot, flower petal and anne on both feet

## 2025-05-14 NOTE — REASON FOR VISIT
[Other: ____] : [unfilled] [FreeTextEntry3] : Ankit Steel, DO [FreeTextEntry1] : May 2025 - Patient returns today for follow-up. She had her extra skin removed and feels that procedure was very successful. She is hoping to have further excess skin removed from her arms and legs.

## 2025-05-14 NOTE — HISTORY OF PRESENT ILLNESS
[FreeTextEntry1] : Patient is a 50 year-old white woman who is referred because of a history of obesity, fatty liver disease, alcoholic cirrhosis with ascites and esophageal varicosities, now being evaluated for liver transplant.  The patient was admitted in November 2020 with shortness of breath and was determined to have a large amount of ascites. She underwent paracentesis. She had a repeat paracentesis in December 2020. She had an upper endoscopy and they saw her esophageal varicosities which were not sclerosed at that time. She has never had an esophageal bleed. She currently takes furosemide 40 mg, spironolactone 100 mg, and nadolol 20 mg.  She also takes mirtazapine 3.75 mg QHS.  The patient never smoked. She has not had any alcohol since end of January 2021. She only has a rare caffeinated coffee.  Her maternal grandfather had arteriosclerotic heart disease at a young age. Mother has hypertension. Her father had atrial fibrillation and talked of an inherited chronic problem.  The patient is allergic to shellfish, penicillin, and quinolones.  Patient denies any chest pains but gets occasional chest tightness when her ascites is worse. She denies any shortness of breath at rest or palpitations.   September 2021 - Patient returns today for follow-up. She continues to feel better, but reports ongoing fatigue. She is studying for a degree in healthcare administration. She has not been exercising much. Patient is no longer being considered for transplant at this time because her MELD is now so low after giving up alcohol. She is in treatment through [facility], and she has been sober for more than 200 days.  She has received three doses of Pfizer's Covid-19 vaccine. Third dose was 8/23/2021.  October 2022 - Patient returns today for follow-up. Her MELD remains low, and she is not currently listed for liver transplant. She has gained approximately 30 lbs since the last time she was here. Patient has now progressed to type II diabetes. She has been started on Januvia. She has less energy than she used to, and so she has not been exercising.   February 2023 TeleHealth Video Encounter Initiated by: Patient election for TeleHealth visit Patient was consented for TeleHealth visit Patient Location: Home Physician Location: Office ([address] [place] [address], [address], [place], [place], [address]) Duration of Encounter: 30 minutes, at least 50% of which was spent in direct counseling and coordination of care.   Patient had her CTCA in December 2022 and was seen to have significant coronary calcifications. She was also noted to have a 70% prox-LAD stenosis.  September 2023 - Patient returns today for follow-up in her usual state of health. She remains on furosemide and spironolactone, but at lower doses than previously.  She remains on nadolol, though, if her varices have resolved, she may plan to wean off the beta-blocker.  She remains on ASA and statin therapy.  She is taking prednisone for polymyalgia rheumatica.   June 2024 TeleHealth Video Encounter Initiated by: Patient election for TeleHealth visit Patient was consented for TeleHealth visit Patient Location: Home Physician Location: Office ([address] [place] [address], [address], [place], [place], [address]) Duration of Encounter: 40 minutes, at least 50% of which was spent in direct counseling and coordination of care.   Patient is being seen for preop evaluation prior to bilateral mastoplexy and excision of bilateral chest wall skin excess on 6/19/2024 with Lauren Beth Shikowitz-Behr, MD at [facility]. Since her last visit with me in September, she has travelled to [place], and upon her return, she joined a gym. She does both aerobic and resistance training with a . She has lost approximately 20 lbs, and she reports feeling great.  September 2024 - Patient returns today for follow-up. She had her extra skin removed and feels that procedure was very successful. She is hoping to have further excess skin removed from her arms and legs.   PMD: Ade Gallo DO [phone] Hepatologist: Chapito Carroll MD [phone]

## 2025-05-21 NOTE — HISTORY OF PRESENT ILLNESS
[FreeTextEntry1] : f/up for Type 2 diabetes mellitus, obesity  Patient with past medical hx as below, remarkable for cirrhosis with ascites, fatty liver disease    Screening  Ophthalmology: follows LDL: 27 EGFR: 91   Current diabetic medication Ozempic 2mg Q weekly   no vomiting, no abd pain

## 2025-06-12 NOTE — PHYSICAL EXAM
[General Appearance - Alert] : alert [General Appearance - Well-Appearing] : healthy appearing [Sclera] : the sclera and conjunctiva were normal [] : the neck was supple [Respiration, Rhythm And Depth] : normal respiratory rhythm and effort [Auscultation Breath Sounds / Voice Sounds] : lungs were clear to auscultation bilaterally [Heart Rate And Rhythm] : heart rate was normal and rhythm regular [Heart Sounds] : normal S1 and S2 [Bowel Sounds] : normal bowel sounds [Abdomen Soft] : soft [Abdomen Tenderness] : non-tender [Skin Color & Pigmentation] : normal skin color and pigmentation [Oriented To Time, Place, And Person] : oriented to person, place, and time [Scleral Icterus] : No Scleral Icterus [Spider Angioma] : No spider angioma(s) were observed [Ascites Shifting Dullness] : no shifting dullness of ascites [Asterixis] : no asterixis observed [Jaundice] : No jaundice

## 2025-06-12 NOTE — ASSESSMENT
[FreeTextEntry1] :  Marta Miller is a 51 y/o female with compensated metALD cirrhosis, here for follow-up.   #Cirrhosis 2/2 metALD, well compensated, MELD 9 - Patient educated on the diagnosis and natural history of cirrhosis, including the manifestations of End Stage Liver Disease (hepatic encephalopathy, HCC, ascites, variceal bleeding, sarcopenia, etc). The medical management and/or candidacy for liver transplantation was discussed. The importance of ETOH/smoking cessation, adequate nutrition, activity, reporting new symptoms, and compliance with lab schedule and clinic visits reviewed. I also emphasized the importance of biannual HCC screening and variceal screening as determined. Patient advised to avoid NSAIDs and common hepatotoxic medications. The role of other medications like statins and acetaminophen safety also discussed. Hepatitis serologies will be checked if not already, and patient will need appropriate HAV/HBV vaccination if indicated. - Euvolemic on exam and recent US w/o ascites > no indication for diuretics (keep Spironolactone and Furosemide d/c) - C/w daily weights and report > 2-3 lb/day weight gain - HE: no sx, reinforced s/s and advised to report immediately - HCC: 6/9/25 US Abd: +Cirrhosis, +splenomegaly, no ascites and no HCC > repeat again in 6 months - pHTN: 2/16/21 EGD: Grade I EV & maintained on Carvedilol > c/w Carvedilol 3.125 mg PO BID (BP at goal) - 6/12/25 Fibroscan: S0/F4 ( / 24.8 kPa) and low Plts > c/w clinically significant portal HTN - Reinforced importance of continued ETOH abstinence and risk for decompensation with ETOH use; will monitor with PETH - Counseled on high protein and low Na diet - C/w healthy dieting and CV/strength training - Advised to avoid nephrotoxic agents - D/C 'NAD Supp' and reinforced importance of judicious use of OTC meds/supps/herbals  - Maintain f/u with other providers for optimization of comorbidities - Reinforced s/s of liver decompensation and advised to report immediately - No indication for liver txp evaluation given low MELD and well compensated disease; will manage medically and refer it appropriate  Update labs prior to f/u appt. RTC prior to leg skin removal surgery.  Roselyn Magaña, MSN, AGACNP-BC Transplant Hepatology Nurse Practitioner [facility] [address] [place] [address] T: [phone] | F: [phone]

## 2025-06-12 NOTE — HISTORY OF PRESENT ILLNESS
[FreeTextEntry1] : Transplant Hepatologist: Jose Martin Davila, DO Transplant Hepatology NP: Roselyn Magaña, [facility]  Marta Miller is a 49 y/o female with a PMH of HLD, T2DM, Polyarthritis, TICO on CPAP, Obesity s/p gastric bypass, AUD, and compensated metALD cirrhosis, here for follow-up.   Patient previously followed by Dr. Carroll and NP Jacy Armstrong and now transferring care as they are no longer with the practice. Patient first made aware of liver disease when was hospitalized at [facility] in early 2024 for alcohol intoxication -- made aware of cirrhosis at this time and underwent first LVP and d/c home with recommendation to establish care with Hepatology. She then established care with Dr. Carroll and was hospitalized again in 11/2024 ([facility]) for liver decompensation -- during this hospitalization she underwent additional LVP x 2 and EGD for variceal screening. Denies hx of SBP.  Right hepatic hydrothorax with thoracentesis on 04/05/2021. Hx of EV and maintained on BB. Denies hx of EVH. No further episodes of liver decompensation. No prior liver bx. No known family hx of liver disease. +Professional tattoos. Prior blood transfusions with lower body lift after significant weight loss. Denies current and prior IVDU. Hx of heavy ETOH use since 2004 after weight loss; when drinking at her heaviest she was consuming ~4 bottle of wine per night. No ETOH since 2/15/21. No prior RPP. No pancreatitis. No legal issues. Hx of morbid obesity with heaviest weight ~520 lbs and underwent gastric bypass with significant weight loss and lost further weight since being sober. Stable weight ~165-174 lbs for many years. Exercises with Pilates. Ethnicity from [place]. Born in . 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 Lives at home with . Independent in ADLs/iADLs.   - 2/16/21 EGD: Grade I EV  - 6/9/25 US Abd: +Cirrhosis, +splenomegaly, no ascites and no HCC - 6/12/25 Fibroscan: S0/F4 ( / 24.8 kPa)  In the interim since last visit, there have been no interim illnesses or hospitalizations. No episodes of liver decompensation. After last visit, Spironolactone was d/c and maintained prn Furosemide. Still maintained on Ozempic 2 mg/week and tolerating well. Stable weight ~168-175 lbs. Active with 'Pilates' 3x/week. Following healthy diet. Has plan for lower leg skin removal in the fall. Patient's allergies, medications, past medical, surgical, family, and social histories were reviewed and updated as appropriate. Seen in clinic today, reports that she feels well and is w/o complaints. Denies any recent fevers, chills, cough, lightheadedness, AMS, abdominal pain, n/v, diarrhea, hematochezia, hematemesis, and melena. Denies alcohol, tobacco, or recreational drug use. OTC use of 'NAD' supplements.   MELD 9 (6/9/25)

## 2025-07-24 NOTE — ASSESSMENT
[FreeTextEntry1] : Assessment/Plan:  51-year-old female w/ hx of alcoholic liver cirrhosis (dx'd 2020), T2DM (diagnosed 9/2022), referred to neurology for brain fog/word finding difficulty, peripheral neuropathy and gait imbalance.  1# Brain fog / word finding difficulty- likely multifactorial- 2/2 to mood disorder +/- long standing hx of alcohol use +/- hepatic disease +/- sleep disorder (chronic insomnia). Low suspicion for neurodegenerative disorder MRI brain normal Neuropsych testing by Dr Adamaris Singh 6/13/2023- intact. Will continue to monitor  2# Peripheral neuropathy- likely mixed/multifactorial (T2DM +/- toxic effects from long standing alcohol use +/-Hereditary neuropathy i/s/o of known family hx- CMT). 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 EMG/NCS 10/15/2023 showed mixed sensorimotor axonal polyneuropathy with superimposed mild b/l CTS and mild R ulnar neuropathy - most likely 2/2 T2DM, however the possibility of CMT2 (hereditary motor sensory neuropathy) was suggested given hx of high arch feet and hammer toes.  Neuropathy overall stable, though experiencing more numbness in feet. Pain improved on duloxetine. Given slight progression in neuropathy need to consider the possibility of superimposed immune mediated neuropathy (tegan i/s/o of systemic autoimmune disease and otherwise stable T2DM and alcohol abstinence), however pt wishes to defer on skin biopsy or CSF testing at this time. Also, progression could simply be 2/2 to hereditary neuropathy. Will follow closely for any further rapid progression.  3# Gait imbalance most likely 2/2 sensory ataxia from peripheral neuropathy Continue PT  Plan:- [] Continue gabapentin 500 mg QHS (prescribed by psych). Higher doses make her drowsy. [] Continue duloxetine 60 mg QD (prescribed by psych) [] Topical Capsaicin at bedtime [] Continue alpha lipoic acid 600 mg QD [] Continue to follow with other specialists and psychologist.   Return to clinic 1 year, or sooner if needed  The above plan was discussed with DESIREE DRISCOLL in great detail. DESIREE DRISCOLL verbalized understanding and agrees with plan as detailed above. Patient was provided education and counselling on current diagnosis/symptoms. She was advised to call our clinic at [phone] for any new or worsening symptoms, or with any questions or concerns. DESIREE DRISCOLL expressed understanding and all her questions/concerns were addressed.    Dennise Patiño M.D.

## 2025-07-24 NOTE — PHYSICAL EXAM
[FreeTextEntry1] : Motor: Strength is full bilaterally. 5/5 muscle power in bilateral UE and LE. Reflexes: R L  Biceps 2+ 2+  Patellar 1+ 1+  Achilles 0 0  Plantar responses- R down, L down Sensory: Reduced sensation to PP up to ankles b/l. Reduced sensation to temp and vibration in feet compared to hands. Vib < 10 seconds toes (L<R) Gait: wide based gait. positive Romberg.

## 2025-07-24 NOTE — HISTORY OF PRESENT ILLNESS
[FreeTextEntry1] : INTERIM HX 07/24/2025: Psych increased duloxetine to 60 mg 3 weeks ago for mood. Off spironolactone and lasix. good days and bad days. Tingling in hands, dropping things. "I am just more aware of symptoms". Walking worse when tired. Extreme fatigue. Joint pains and tenderness. + insomnia. Spacey at times. Trouble focusing. She sees psychologist.   INTERIM HX 03/12/2025: Hereditary neuropathy panel 9/2024- VUS (GARS1 gene, heterozygous)- pathogenic variants have been identified in pts with CMT2D. Psych is managing her duloxetine, and she is now on 40 mg QD.  Flares up of finger joint swelling and stiffness, scheduled to see Rheum.  Feet always cold. Numbness up to ankle now. Minimal tingling in fingertips.  mother and great father had hammer toes and high arches. Mother is alive and walks with walker.   INTERIM HX 08/14/2024: Doing well. Psych increased gabapentin to 500 mg QD. She is taking duloxetine 20 mg QD- helps her neuropathy pain at night. Tolerating the med better now, initially made her very drowsy. She had breast lift and skin fold removal surgery in June. Seeing PT for balance.  INTERIM HX 12/29/2023: EMG/NCS 10/15/2023 showed mixed sensorimotor axonal polyneuropathy with superimposed mild b/l CTS and mild R ulnar neuropathy - most likely 2/2 T2DM, however the possibility of CMT2 (hereditary motor sensory neuropathy) was suggested given hx of high arch feet and hammer toes.  She is doing well. PMR symptoms better. Neuropathy is stable, tingling in fingertips and feet.   INTERIM HX 06/29/2023: MRI brain 3/25/2023- normal.  Labs 4'23- Vitamin b6 nl, dsnda nl, ACE nl, SS nl, Lyme neg, MMA nl, cryoglobulin nl, ganglioside ab nl, syphilis neg, C3 and C4 nl. Folate nl. normal Spep                  Abnormal labs: ESR 60 (recent 14 on 6/2023). B12 < 2000. Elevated k/l ratio 1.84 and FLC  Can sometimes take extra of gabapentin for nerve pain. Nerve pain better/more tolerable. No progression.  Seeing Rheum, Dr Gigi edmond, possible PMR, on prednisone (tapering), helping.  Referred to hem for elevated K/L ratio- seen last month, no concerns, repeat KL ratio normal.  Neuropsych testing 2 weeks ago, Dr Adamaris Singh (pending results)- pt reports "it was fine" Did 4 sessions of PT, had to stop as she is caring for mother. She is doing exercises at home. Helping a little.  Seeing pulm for chronic insomnia, PLS and nocturnal hypoxemia, using O2 at home. No TICO on sleep study. --------------------------------------------------------------------------------------------------------------------------------------- HPI (initial visit Mar 02, 2023)- DESIREE DRISCOLL is a 48 year old woman w/ hx of T2DM with peripheral neuropathy, HLD, alcoholic cirrhosis w/ ascites, hx of gastric bypass, depression/anxiety referred to neurology for brain fog and nerve pain.   In the last 1-2 years has been experiencing "foggy brain". Word finding difficulty. Not progression. Comes and goes. Worse when tired or stressed. Not daily issues. No disorientation. no issues with faces or names. Memory of recent events or past events intact. She is 2 years sober (alcoholic x 15 years, 2-3 bottles of wine a day75 ounces a day). She was diagnosed with cirrhosis 10/2020, she was hospitalized (felt ill, shaky, jaundiced), was put on liver transplant list, since her LFT;s have improved, and transplant now on hold. She has been told she snored, she does have daytime somnolence. Dry mouth in mornings (is on meds that can cause this). no morning headaches. Tested for sleep apnea, prior to 2004, had "mild" sleep apnea, no Cpap recommended. TSH normal 9/2022.  She has been on gabapentin 400 mg qhs for her neuropathy, higher dose make her sleepy, helps the pain. Neuropathy symptoms began 2 years ago. She was preDM for several years, diagnosed with T2DM 6 months ago. Tingling and numbness in toes, anterior sole and dorsum of foot. Diagnosed by podiatrist.  At night skin around the feet hurt (allodynia). She has noticed some gait imbalance. cannot differentiate between hot and cold. hbA1c 9/2022 6/7%, most recent 7.9% 1/2023. Neuropathy stable in last 6 months. Mild sensitivity in tips of fingers, this started in the last 8 months.

## 2025-07-24 NOTE — DATA REVIEWED
[de-identified] : MRI brain 3/25/2023- normal [de-identified] :  EMG/NCS 10/15/2023 showed mixed sensorimotor axonal polyneuropathy with superimposed mild b/l CTS and mild R ulnar neuropathy - most likely 2/2 T2DM, however the possibility of CMT2 (hereditary motor sensory neuropathy) was suggested given hx of high arch feet and hammer toes.